# Patient Record
Sex: FEMALE | Race: WHITE | NOT HISPANIC OR LATINO | Employment: STUDENT | URBAN - METROPOLITAN AREA
[De-identification: names, ages, dates, MRNs, and addresses within clinical notes are randomized per-mention and may not be internally consistent; named-entity substitution may affect disease eponyms.]

---
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
## 2025-03-03 ENCOUNTER — TELEPHONE (OUTPATIENT)
Dept: GASTROENTEROLOGY | Facility: CLINIC | Age: 20
End: 2025-03-03
Payer: COMMERCIAL

## 2025-03-03 RX ORDER — OMEPRAZOLE 40 MG/1
40 CAPSULE, DELAYED RELEASE ORAL DAILY
Qty: 30 CAPSULE | Refills: 5 | Status: SHIPPED | OUTPATIENT
Start: 2025-03-03

## 2025-04-09 ENCOUNTER — TELEPHONE (OUTPATIENT)
Dept: GASTROENTEROLOGY | Facility: CLINIC | Age: 20
End: 2025-04-09

## 2025-04-09 NOTE — TELEPHONE ENCOUNTER
Hub staff attempted to follow warm transfer process and was unsuccessful     Caller: Varsha Lowe    Relationship to patient: Self    Best call back number: [phone]      Patient is needing: PT STATED HER SYMPTOMS ARE GETTING WORSE AND EVERY TME SHE EATS, ITS COMING BACK UP. WOULD LIKE TO BE SEEN SOONER, PLS CALL PT TO DISCUSS.

## 2025-04-23 ENCOUNTER — TELEPHONE (OUTPATIENT)
Dept: GASTROENTEROLOGY | Facility: CLINIC | Age: 20
End: 2025-04-23

## 2025-04-23 NOTE — TELEPHONE ENCOUNTER
Hub staff attempted to follow warm transfer process and was unsuccessful     Caller: Varsha Lowe    Relationship to patient: Self    Best call back number: [phone]    Patient is needing: PATIENT NEEDING TO SPEAK WITH CLINICAL.  STATING ITS URGENT.  DID NOT SPECIFY

## 2025-05-09 ENCOUNTER — APPOINTMENT (OUTPATIENT)
Dept: ULTRASOUND IMAGING | Facility: HOSPITAL | Age: 20
End: 2025-05-09
Payer: COMMERCIAL

## 2025-05-09 ENCOUNTER — TRANSCRIBE ORDERS (OUTPATIENT)
Dept: LAB | Facility: HOSPITAL | Age: 20
End: 2025-05-09
Payer: COMMERCIAL

## 2025-05-09 ENCOUNTER — LAB (OUTPATIENT)
Dept: LAB | Facility: HOSPITAL | Age: 20
End: 2025-05-09
Payer: COMMERCIAL

## 2025-05-09 ENCOUNTER — HOSPITAL ENCOUNTER (EMERGENCY)
Facility: HOSPITAL | Age: 20
Discharge: HOME | End: 2025-05-10
Attending: EMERGENCY MEDICINE
Payer: COMMERCIAL

## 2025-05-09 DIAGNOSIS — O36.80X0 PREGNANCY OF UNKNOWN ANATOMIC LOCATION: Primary | ICD-10-CM

## 2025-05-09 DIAGNOSIS — O36.80X0 PREGNANCY, LOCATION UNKNOWN: Primary | ICD-10-CM

## 2025-05-09 LAB
ABO GROUP BLD: NORMAL
ALBUMIN SERPL-MCNC: 4.2 G/DL (ref 3.5–5.2)
ALBUMIN/GLOB SERPL: 1.3 G/DL
ALP SERPL-CCNC: 96 U/L (ref 39–117)
ALT SERPL W P-5'-P-CCNC: 25 U/L (ref 1–33)
ANION GAP SERPL CALCULATED.3IONS-SCNC: 11.9 MMOL/L (ref 5–15)
AST SERPL-CCNC: 24 U/L (ref 1–32)
BASOPHILS # BLD AUTO: 0.09 10*3/MM3 (ref 0–0.2)
BASOPHILS NFR BLD AUTO: 0.8 % (ref 0–1.5)
BILIRUB SERPL-MCNC: <0.2 MG/DL (ref 0–1.2)
BLD GP AB SCN SERPL QL: NEGATIVE
BUN SERPL-MCNC: 12 MG/DL (ref 6–20)
BUN/CREAT SERPL: 14.3 (ref 7–25)
CALCIUM SPEC-SCNC: 9.5 MG/DL (ref 8.6–10.5)
CHLORIDE SERPL-SCNC: 103 MMOL/L (ref 98–107)
CO2 SERPL-SCNC: 23.1 MMOL/L (ref 22–29)
CREAT SERPL-MCNC: 0.84 MG/DL (ref 0.57–1)
DEPRECATED RDW RBC AUTO: 38.3 FL (ref 37–54)
EGFRCR SERPLBLD CKD-EPI 2021: 102.8 ML/MIN/1.73
EOSINOPHIL # BLD AUTO: 0.51 10*3/MM3 (ref 0–0.4)
EOSINOPHIL NFR BLD AUTO: 4.6 % (ref 0.3–6.2)
ERYTHROCYTE [DISTWIDTH] IN BLOOD BY AUTOMATED COUNT: 14 % (ref 12.3–15.4)
GLOBULIN UR ELPH-MCNC: 3.2 GM/DL
GLUCOSE SERPL-MCNC: 110 MG/DL (ref 65–99)
HCG INTACT+B SERPL-ACNC: 293 MIU/ML
HCG INTACT+B SERPL-ACNC: 403 MIU/ML
HCT VFR BLD AUTO: 35.4 % (ref 34–46.6)
HGB BLD-MCNC: 11.3 G/DL (ref 12–15.9)
IMM GRANULOCYTES # BLD AUTO: 0.05 10*3/MM3 (ref 0–0.05)
IMM GRANULOCYTES NFR BLD AUTO: 0.5 % (ref 0–0.5)
LYMPHOCYTES # BLD AUTO: 2.5 10*3/MM3 (ref 0.7–3.1)
LYMPHOCYTES NFR BLD AUTO: 22.7 % (ref 19.6–45.3)
MCH RBC QN AUTO: 24.6 PG (ref 26.6–33)
MCHC RBC AUTO-ENTMCNC: 31.9 G/DL (ref 31.5–35.7)
MCV RBC AUTO: 77.1 FL (ref 79–97)
MONOCYTES # BLD AUTO: 0.9 10*3/MM3 (ref 0.1–0.9)
MONOCYTES NFR BLD AUTO: 8.2 % (ref 5–12)
NEUTROPHILS NFR BLD AUTO: 6.96 10*3/MM3 (ref 1.7–7)
NEUTROPHILS NFR BLD AUTO: 63.2 % (ref 42.7–76)
NRBC BLD AUTO-RTO: 0 /100 WBC (ref 0–0.2)
PLATELET # BLD AUTO: 521 10*3/MM3 (ref 140–450)
PMV BLD AUTO: 9.1 FL (ref 6–12)
POTASSIUM SERPL-SCNC: 3.7 MMOL/L (ref 3.5–5.2)
PROT SERPL-MCNC: 7.4 G/DL (ref 6–8.5)
RBC # BLD AUTO: 4.59 10*6/MM3 (ref 3.77–5.28)
RH BLD: POSITIVE
SODIUM SERPL-SCNC: 138 MMOL/L (ref 136–145)
T&S EXPIRATION DATE: NORMAL
WBC NRBC COR # BLD AUTO: 11.01 10*3/MM3 (ref 3.4–10.8)

## 2025-05-09 PROCEDURE — 76815 OB US LIMITED FETUS(S): CPT

## 2025-05-09 PROCEDURE — 84702 CHORIONIC GONADOTROPIN TEST: CPT | Performed by: PHYSICIAN ASSISTANT

## 2025-05-09 PROCEDURE — 36415 COLL VENOUS BLD VENIPUNCTURE: CPT

## 2025-05-09 PROCEDURE — 86901 BLOOD TYPING SEROLOGIC RH(D): CPT | Performed by: PHYSICIAN ASSISTANT

## 2025-05-09 PROCEDURE — 99284 EMERGENCY DEPT VISIT MOD MDM: CPT

## 2025-05-09 PROCEDURE — 93976 VASCULAR STUDY: CPT

## 2025-05-09 PROCEDURE — 80053 COMPREHEN METABOLIC PANEL: CPT | Performed by: PHYSICIAN ASSISTANT

## 2025-05-09 PROCEDURE — 84702 CHORIONIC GONADOTROPIN TEST: CPT

## 2025-05-09 PROCEDURE — 76817 TRANSVAGINAL US OBSTETRIC: CPT

## 2025-05-09 PROCEDURE — 86850 RBC ANTIBODY SCREEN: CPT | Performed by: PHYSICIAN ASSISTANT

## 2025-05-09 PROCEDURE — 86900 BLOOD TYPING SEROLOGIC ABO: CPT | Performed by: PHYSICIAN ASSISTANT

## 2025-05-09 PROCEDURE — 85025 COMPLETE CBC W/AUTO DIFF WBC: CPT | Performed by: PHYSICIAN ASSISTANT

## 2025-05-09 RX ORDER — SODIUM CHLORIDE 0.9 % (FLUSH) 0.9 %
10 SYRINGE (ML) INJECTION AS NEEDED
Status: DISCONTINUED | OUTPATIENT
Start: 2025-05-09 | End: 2025-05-10 | Stop reason: HOSPADM

## 2025-05-10 VITALS
HEART RATE: 95 BPM | OXYGEN SATURATION: 98 % | RESPIRATION RATE: 16 BRPM | DIASTOLIC BLOOD PRESSURE: 85 MMHG | SYSTOLIC BLOOD PRESSURE: 143 MMHG | HEIGHT: 68 IN | BODY MASS INDEX: 37.89 KG/M2 | TEMPERATURE: 97.1 F | WEIGHT: 250 LBS

## 2025-05-10 NOTE — DISCHARGE INSTRUCTIONS
Follow-up with OB/GYN on Monday for repeat hCG.  Proceed immediately to emergency department if you have any vaginal bleeding.

## 2025-05-10 NOTE — ED PROVIDER NOTES
MD ATTESTATION NOTE    The ANJELICA and I have discussed this patient's history, physical exam, and treatment plan.  I have reviewed the documentation and personally had a face to face interaction with the patient. I affirm the documentation and agree with the treatment and plan.  The attached note describes my personal findings.      I provided a substantive portion of the care of the patient.  I personally performed the physical exam in its entirety, and below are my findings.        Brief HPI: This patient is a 19-year-old female presenting to the emergency room today with left sided pelvic pain.  She states that she was at her gynecologist office and had a positive pregnancy test however nothing obviously seen on transvaginal ultrasound.  She presents today with worsening discomfort.  She however denies dysuria/hematuria, back pain, or vaginal bleeding.      PHYSICAL EXAM  ED Triage Vitals   Temp Heart Rate Resp BP SpO2   05/09/25 2023 05/09/25 2023 05/09/25 2023 05/09/25 2026 05/09/25 2023   99.2 °F (37.3 °C) 119 20 142/84 100 %      Temp src Heart Rate Source Patient Position BP Location FiO2 (%)   -- -- 05/09/25 2026 05/09/25 2026 --     Sitting Right arm          GENERAL: Resting comfortably and in no acute distress, nontoxic in appearance  HENT: nares patent  EYES: no scleral icterus  CV: regular rhythm, normal rate, no M/R/G  RESPIRATORY: normal effort, lungs clear bilaterally  ABDOMEN: soft, nontender, no rebound or guarding  MUSCULOSKELETAL: no deformity, no edema  NEURO: alert, moves all extremities, follows commands  PSYCH:  calm, cooperative  SKIN: warm, dry    Vital signs and nursing notes reviewed.      Differential diagnosis includes but is not limited to ectopic pregnancy, early first trimester pregnancy, or ovarian cyst.      Plan: We will obtain labs as well as a pelvic/transvaginal ultrasound.  We will discuss the findings with her OB/GYN following.      Lab values independently interpreted by  myself with my interpretation showing a slightly elevating hCG with a level of 403, up when compared to an office hCG earlier of 293.       Lonnie Acuña MD  05/10/25 0052

## 2025-05-10 NOTE — ED PROVIDER NOTES
EMERGENCY DEPARTMENT ENCOUNTER  Room Number:  01/01  PCP: Daniel Garrison MD  Independent Historians: Patient      HPI:  Chief Complaint: had concerns including Abdominal Pain and Pregnancy Problem.     A complete HPI/ROS/PMH/PSH/SH/FH are unobtainable due to: None    Chronic or social conditions impacting patient care (Social Determinants of Health): None      Context: Varsha Lowe is a 19 y.o. female with a medical history of asthma who presents to the ED c/o acute left-sided pelvic pain.  Patient reports for the last several days she has had pain on the left side of her pelvis.  She thought this was gas.  She had an appointment with gynecology today after having 19 positive home pregnancy test.  Her hCG was 293 in office today.  She had transvaginal ultrasound performed that showed left tubal mass versus ectopic pregnancy.  Denies vaginal bleeding or spotting.  Reports her pain has persisted throughout the day, prompting visit to emergency department.  Established with [facility].  Has had some nausea but denies vomiting or fever.  No other systemic complaints at this time.      Review of prior external notes (non-ED) -and- Review of prior external test results outside of this encounter:  Patient seen in office by PCP on 11/6/2024 for obesity.  Unable to load note to review assessment and plan.  Reviewed labs collected on 10/29/2024.  CBC with hemoglobin 12.2, CMP with creatinine 0.88.    Prescription drug monitoring program review:     N/A    PAST MEDICAL HISTORY  Active Ambulatory Problems     Diagnosis Date Noted    Upper abdominal pain 07/29/2024    Abdominal bloating with cramps 07/29/2024    Irregular bowel habits 07/29/2024    Abdominal pain 10/30/2024     Resolved Ambulatory Problems     Diagnosis Date Noted    No Resolved Ambulatory Problems     Past Medical History:   Diagnosis Date    Asthma     No pertinent past medical history          PAST SURGICAL HISTORY  Past Surgical History:    Procedure Laterality Date    COLONOSCOPY N/A 9/16/2024    Procedure: COLONOSCOPY FOR SCREENING;  Surgeon: Fred Henderson MD;  Location: [facility] MAIN OR;  Service: Gastroenterology;  Laterality: N/A;  normal    ENDOSCOPY N/A 9/16/2024    Procedure: ESOPHAGOGASTRODUODENOSCOPY;  Surgeon: Fred Henderson MD;  Location: [facility] [place] [facility] MAIN OR;  Service: Gastroenterology;  Laterality: N/A;  normal    FRENULECTOMY      as an infant and at 18 years old    NO PAST SURGERIES           FAMILY HISTORY  Family History   Problem Relation Age of Onset    Irritable bowel syndrome Mother     Thyroid disease Mother     Colon polyps Father     Colon cancer Paternal Uncle     Breast cancer Neg Hx     Ovarian cancer Neg Hx     Uterine cancer Neg Hx     Crohn's disease Neg Hx     Ulcerative colitis Neg Hx          SOCIAL HISTORY  Social History     Socioeconomic History    Marital status: Significant Other   Tobacco Use    Smoking status: Never    Smokeless tobacco: Never   Vaping Use    Vaping status: Never Used   Substance and Sexual Activity    Alcohol use: Never    Drug use: Never    Sexual activity: Yes     Partners: Male     Birth control/protection: I.U.D., Condom         ALLERGIES  Amoxicillin, Diphenhydramine, and Penicillins      REVIEW OF SYSTEMS  Included in HPI  All systems reviewed and negative except for those discussed in HPI.      PHYSICAL EXAM    I have reviewed the triage vital signs and nursing notes.    ED Triage Vitals   Temp Heart Rate Resp BP SpO2   05/09/25 2023 05/09/25 2023 05/09/25 2023 05/09/25 2026 05/09/25 2023   99.2 °F (37.3 °C) 119 20 142/84 100 %      Temp src Heart Rate Source Patient Position BP Location FiO2 (%)   -- -- 05/09/25 2026 05/09/25 2026 --     Sitting Right arm        Physical Exam  Constitutional:       General: She is not in acute distress.     Appearance: She is well-developed.   HENT:      Head: Normocephalic and atraumatic.   Eyes:      Extraocular Movements: Extraocular movements  intact.   Cardiovascular:      Rate and Rhythm: Normal rate and regular rhythm.      Heart sounds: Normal heart sounds.   Pulmonary:      Effort: Pulmonary effort is normal.      Breath sounds: Normal breath sounds.   Abdominal:      General: There is no distension.      Tenderness:  in the left lower quadrant   Skin:     General: Skin is warm.   Neurological:      General: No focal deficit present.      Mental Status: She is alert and oriented to person, place, and time.   Psychiatric:         Mood and Affect: Mood normal.             LAB RESULTS  Recent Results (from the past 24 hours)   hCG, Quantitative, Pregnancy    Collection Time: 05/09/25  1:00 PM    Specimen: Blood   Result Value Ref Range    HCG Quantitative 293.00 mIU/mL   Comprehensive Metabolic Panel    Collection Time: 05/09/25 10:22 PM    Specimen: Blood   Result Value Ref Range    Glucose 110 (H) 65 - 99 mg/dL    BUN 12 6 - 20 mg/dL    Creatinine 0.84 0.57 - 1.00 mg/dL    Sodium 138 136 - 145 mmol/L    Potassium 3.7 3.5 - 5.2 mmol/L    Chloride 103 98 - 107 mmol/L    CO2 23.1 22.0 - 29.0 mmol/L    Calcium 9.5 8.6 - 10.5 mg/dL    Total Protein 7.4 6.0 - 8.5 g/dL    Albumin 4.2 3.5 - 5.2 g/dL    ALT (SGPT) 25 1 - 33 U/L    AST (SGOT) 24 1 - 32 U/L    Alkaline Phosphatase 96 39 - 117 U/L    Total Bilirubin <0.2 0.0 - 1.2 mg/dL    Globulin 3.2 gm/dL    A/G Ratio 1.3 g/dL    BUN/Creatinine Ratio 14.3 7.0 - 25.0    Anion Gap 11.9 5.0 - 15.0 mmol/L    eGFR 102.8 >60.0 mL/min/1.73   hCG, Quantitative, Pregnancy    Collection Time: 05/09/25 10:22 PM    Specimen: Blood   Result Value Ref Range    HCG Quantitative 403.00 mIU/mL   Type & Screen    Collection Time: 05/09/25 10:22 PM    Specimen: Blood   Result Value Ref Range    ABO Type O     RH type Positive     Antibody Screen Negative     T&S Expiration Date 5/12/2025 11:59:59 PM    CBC Auto Differential    Collection Time: 05/09/25 10:22 PM    Specimen: Blood   Result Value Ref Range    WBC 11.01 (H) 3.40 -  10.80 10*3/mm3    RBC 4.59 3.77 - 5.28 10*6/mm3    Hemoglobin 11.3 (L) 12.0 - 15.9 g/dL    Hematocrit 35.4 34.0 - 46.6 %    MCV 77.1 (L) 79.0 - 97.0 fL    MCH 24.6 (L) 26.6 - 33.0 pg    MCHC 31.9 31.5 - 35.7 g/dL    RDW 14.0 12.3 - 15.4 %    RDW-SD 38.3 37.0 - 54.0 fl    MPV 9.1 6.0 - 12.0 fL    Platelets 521 (H) 140 - 450 10*3/mm3    Neutrophil % 63.2 42.7 - 76.0 %    Lymphocyte % 22.7 19.6 - 45.3 %    Monocyte % 8.2 5.0 - 12.0 %    Eosinophil % 4.6 0.3 - 6.2 %    Basophil % 0.8 0.0 - 1.5 %    Immature Grans % 0.5 0.0 - 0.5 %    Neutrophils, Absolute 6.96 1.70 - 7.00 10*3/mm3    Lymphocytes, Absolute 2.50 0.70 - 3.10 10*3/mm3    Monocytes, Absolute 0.90 0.10 - 0.90 10*3/mm3    Eosinophils, Absolute 0.51 (H) 0.00 - 0.40 10*3/mm3    Basophils, Absolute 0.09 0.00 - 0.20 10*3/mm3    Immature Grans, Absolute 0.05 0.00 - 0.05 10*3/mm3    nRBC 0.0 0.0 - 0.2 /100 WBC         RADIOLOGY  US Ob Limited 1 + Fetuses  US Ob Limited 1 + Fetuses, US Testicular or Ovarian Vascular Limited  Result Date: 5/9/2025  PELVIC ULTRASOUND  HISTORY: Left pelvic pain  COMPARISON: None available.  TECHNIQUE: Grayscale, color Doppler, and spectral Doppler waveform analysis was performed through the pelvis transabdominally and transvaginally.  FINDINGS: Uterus measures 6.9 x 3.9 x 5.2 cm. It is retroverted. The endometrium measures up to 1.7 cm. No intrauterine pregnancy is seen. The right ovary measures 2.7 x 3.0 x 1.6 cm. The left ovary measures 4.1 x 2.7 x 4.5 cm. Normal color Doppler flow is noted within the ovaries. The patient does have a cyst arising from the left ovary, measuring 2.5 x 2.6 x 2.5 cm. No adnexal masses are seen. There is some free fluid within the pelvis.      No intrauterine pregnancy is identified. Estimated gestational age by dates is 13 weeks and 5 days. The patient's quantitative beta hCG is 403, which has increased when compared to earlier today, when it measured 293. While no adnexal masses are seen on the current  study, an earlier exam evidently demonstrated a possible ectopic pregnancy within the left adnexa. Given the lack of an intrauterine pregnancy, as well as the rising beta hCG, ectopic pregnancy remains in the differential, and close obstetric and sonographic follow-up is recommended, as is attention to serial beta-hCGs.  This report was finalized on 5/9/2025 11:26 PM by Dr. Delphine Vela M.D on Workstation: [id number]          MEDICATIONS GIVEN IN ER  Medications   sodium chloride 0.9 % flush 10 mL (has no administration in time range)         ORDERS PLACED DURING THIS VISIT:  Orders Placed This Encounter   Procedures    US Ob Limited 1 + Fetuses    US Ob Transvaginal    US Testicular or Ovarian Vascular Limited    Comprehensive Metabolic Panel    hCG, Quantitative, Pregnancy    CBC Auto Differential    OB/GYN (on-call MD unless specified)    Type & Screen    Insert Peripheral IV    CBC & Differential         OUTPATIENT MEDICATION MANAGEMENT:  Current Facility-Administered Medications Ordered in Epic   Medication Dose Route Frequency Provider Last Rate Last Admin    sodium chloride 0.9 % flush 10 mL  10 mL Intravenous PRN Maria Teresa Read PA-C         Current Outpatient Medications Ordered in Epic   Medication Sig Dispense Refill    clobetasol propionate (TEMOVATE) 0.05 % cream Apply 1 Application topically to the appropriate area as directed 2 (Two) Times a Day. As needed      lamoTRIgine (LaMICtal) 150 MG tablet Take 1 tablet by mouth Every Night.      NON FORMULARY MigRelief      omeprazole (priLOSEC) 40 MG capsule TAKE ONE CAPSULE BY MOUTH DAILY 30 capsule 5    ondansetron ODT (ZOFRAN-ODT) 4 MG disintegrating tablet Take 1 tablet by mouth Every 6 (Six) Hours As Needed for Nausea or Vomiting. 20 tablet 0    sertraline (ZOLOFT) 50 MG tablet Take 1 tablet by mouth Daily.      spironolactone (ALDACTONE) 100 MG tablet Take 1 tablet by mouth Daily.               PROGRESS, DATA ANALYSIS, CONSULTS, AND MEDICAL  DECISION MAKING  All labs have been independently interpreted by me.  All radiology studies have been reviewed by me. All EKG's have been independently viewed and interpreted by me.  Discussion below represents my analysis of pertinent findings related to patient's condition, differential diagnosis, treatment plan and final disposition.    Differential diagnosis includes but is not limited to ectopic pregnancy, TOA, ovarian cyst.        ED Course as of 05/10/25 0016   Fri May 09, 2025   2246 WBC(!): 11.01 [MP]   2246 Hemoglobin(!): 11.3 [MP]   2339 Will place consult to OB/GYN to discuss [MP]   2347 I spoke with Kitty with [facility].  Discussed patient presentation and ED findings.  Discussed patient presentation and ED findings.  She recommends having patient follow-up closely in office next week.  Patient should go to emergency department for any bleeding. [MP]   Sat May 10, 2025   0015 Patient presents to ED with possible ectopic pregnancy.  Worked up earlier today in office with hCG which was 293.  It is up to 403 at this time.  Noted to have left ovarian cyst but no definitive IUP or ectopic pregnancy.  Given pelvic pain I would still consider ectopic in the differential.  She states she is scheduled to have repeat lab draw on Sunday.  Encouraged her to follow-up closely with OB.  Discussed ED return precautions.  She is otherwise well-appearing, hemodynamically stable, and therefore appropriate for discharge. [MP]      ED Course User Index  [MP] Maria Teresa Read PA-C             AS OF 00:16 EDT VITALS:    BP - 131/82  HR - 100  TEMP - 99.2 °F (37.3 °C)  O2 SATS - 100%    COMPLEXITY OF CARE  Admission was considered but after careful review of the patient's presentation, physical examination, diagnostic results, and response to treatment the patient may be safely discharged with outpatient follow-up.      DIAGNOSIS  Final diagnoses:   Pregnancy of unknown anatomic location         DISPOSITION  ED  Disposition       ED Disposition   Discharge    Condition   Stable    Comment   --                Please note that portions of this document were completed with a voice recognition program.    Note Disclaimer: At [facility], we believe that sharing information builds trust and better relationships. You are receiving this note because you recently visited [facility]. It is possible you will see health information before a provider has talked with you about it. This kind of information can be easy to misunderstand. To help you fully understand what it means for your health, we urge you to discuss this note with your provider.     Maria Teresa Read PA-C  05/10/25 0016

## 2025-05-11 ENCOUNTER — LAB REQUISITION (OUTPATIENT)
Dept: LAB | Facility: HOSPITAL | Age: 20
End: 2025-05-11
Payer: COMMERCIAL

## 2025-05-11 DIAGNOSIS — O36.80X0 PREGNANCY WITH INCONCLUSIVE FETAL VIABILITY, NOT APPLICABLE OR UNSPECIFIED: ICD-10-CM

## 2025-05-11 LAB — HCG INTACT+B SERPL-ACNC: 777 MIU/ML

## 2025-05-11 PROCEDURE — 84702 CHORIONIC GONADOTROPIN TEST: CPT

## 2025-05-14 ENCOUNTER — TELEPHONE (OUTPATIENT)
Dept: OBSTETRICS AND GYNECOLOGY | Facility: CLINIC | Age: 20
End: 2025-05-14
Payer: COMMERCIAL

## 2025-05-14 ENCOUNTER — TELEPHONE (OUTPATIENT)
Dept: OBSTETRICS AND GYNECOLOGY | Age: 20
End: 2025-05-14
Payer: COMMERCIAL

## 2025-05-14 NOTE — TELEPHONE ENCOUNTER
Former pt last seen in  calling stating she has been seen with [facility] for (+) pregnancy but was advised they believe she is having an ectopic pregnancy & prescribed her an  pill to take. Pt stating she did not feel comfortable with this & proceeded to go to [facility] ER on 25 (see US & HCG labs in chart). No intrauterine pregnancy was seen during US but pt stating HCG levels are rising. Pt denies any vaginal bleeding or pain. Pt calling today to see what Dr. Núñez recommends for her to do. Pt does have a new OB appt scheduled with Dr. Dixon on 25 but stated she prefers to be seen here if possible since she is already an established pt. I attempted to schedule pt in office with US but cannot find any open appointments within appropriate time frame. Please advise on recommendations for pt.

## 2025-05-15 NOTE — TELEPHONE ENCOUNTER
The reported findings are concerning for ectopic pregnancy.  I think she should receive prompt care so should go to appointment at [facility] today.  Alternatively, I am happy to see her in [place] tomorrow or we could see if Dr. Sanches would see her in [place].  Regardless, I think prompt care should be her priority.  Please let me know how she would like to proceed.

## 2025-05-15 NOTE — TELEPHONE ENCOUNTER
Problem: NEUROSENSORY - ADULT  Goal: Achieves stable or improved neurological status  Description: INTERVENTIONS  - Monitor and report changes in neurological status  - Monitor vital signs such as temperature, blood pressure, glucose, and any other labs ordered   - Initiate measures to prevent increased intracranial pressure  - Monitor for seizure activity and implement precautions if appropriate      Outcome: Progressing  Goal: Remains free of injury related to seizures activity  Description: INTERVENTIONS  - Maintain airway, patient safety  and administer oxygen as ordered  - Monitor patient for seizure activity, document and report duration and description of seizure to physician/advanced practitioner  - If seizure occurs,  ensure patient safety during seizure  - Reorient patient post seizure  - Seizure pads on all 4 side rails  - Instruct patient/family to notify RN of any seizure activity including if an aura is experienced  - Instruct patient/family to call for assistance with activity based on nursing assessment  - Administer anti-seizure medications if ordered    Outcome: Progressing  Goal: Achieves maximal functionality and self care  Description: INTERVENTIONS  - Monitor swallowing and airway patency with patient fatigue and changes in neurological status  - Encourage and assist patient to increase activity and self care.   - Encourage visually impaired, hearing impaired and aphasic patients to use assistive/communication devices  Outcome: Progressing     Problem: CARDIOVASCULAR - ADULT  Goal: Maintains optimal cardiac output and hemodynamic stability  Description: INTERVENTIONS:  - Monitor I/O, vital signs and rhythm  - Monitor for S/S and trends of decreased cardiac output  - Administer and titrate ordered vasoactive medications to optimize hemodynamic stability  - Assess quality of pulses, skin color and temperature  - Assess for signs of decreased coronary artery perfusion  - Instruct patient to  Spoke w/pt's mother Carolina Lowe.  She states they are currently at the appt at [facility] and an IUP was noted on the [place].  Pt is requesting to see Dr. Sanches for follow up next week.   report change in severity of symptoms  Outcome: Progressing  Goal: Absence of cardiac dysrhythmias or at baseline rhythm  Description: INTERVENTIONS:  - Continuous cardiac monitoring, vital signs, obtain 12 lead EKG if ordered  - Administer antiarrhythmic and heart rate control medications as ordered  - Monitor electrolytes and administer replacement therapy as ordered  Outcome: Progressing     Problem: RESPIRATORY - ADULT  Goal: Achieves optimal ventilation and oxygenation  Description: INTERVENTIONS:  - Assess for changes in respiratory status  - Assess for changes in mentation and behavior  - Position to facilitate oxygenation and minimize respiratory effort  - Oxygen administered by appropriate delivery if ordered  - Initiate smoking cessation education as indicated  - Encourage broncho-pulmonary hygiene including cough, deep breathe, Incentive Spirometry  - Assess the need for suctioning and aspirate as needed  - Assess and instruct to report SOB or any respiratory difficulty  - Respiratory Therapy support as indicated  Outcome: Progressing     Problem: GASTROINTESTINAL - ADULT  Goal: Minimal or absence of nausea and/or vomiting  Description: INTERVENTIONS:  - Administer IV fluids if ordered to ensure adequate hydration  - Maintain NPO status until nausea and vomiting are resolved  - Nasogastric tube if ordered  - Administer ordered antiemetic medications as needed  - Provide nonpharmacologic comfort measures as appropriate  - Advance diet as tolerated, if ordered  - Consider nutrition services referral to assist patient with adequate nutrition and appropriate food choices  Outcome: Progressing  Goal: Maintains or returns to baseline bowel function  Description: INTERVENTIONS:  - Assess bowel function  - Encourage oral fluids to ensure adequate hydration  - Administer IV fluids if ordered to ensure adequate hydration  - Administer ordered medications as needed  - Encourage mobilization and activity  - Consider  nutritional services referral to assist patient with adequate nutrition and appropriate food choices  Outcome: Progressing  Goal: Maintains adequate nutritional intake  Description: INTERVENTIONS:  - Monitor percentage of each meal consumed  - Identify factors contributing to decreased intake, treat as appropriate  - Assist with meals as needed  - Monitor I&O, weight, and lab values if indicated  - Obtain nutrition services referral as needed  Outcome: Progressing  Goal: Establish and maintain optimal ostomy function  Description: INTERVENTIONS:  - Assess bowel function  - Encourage oral fluids to ensure adequate hydration  - Administer IV fluids if ordered to ensure adequate hydration   - Administer ordered medications as needed  - Encourage mobilization and activity  - Nutrition services referral to assist patient with appropriate food choices  - Assess stoma site  - Consider wound care consult   Outcome: Progressing  Goal: Oral mucous membranes remain intact  Description: INTERVENTIONS  - Assess oral mucosa and hygiene practices  - Implement preventative oral hygiene regimen  - Implement oral medicated treatments as ordered  - Initiate Nutrition services referral as needed  Outcome: Progressing     Problem: GENITOURINARY - ADULT  Goal: Maintains or returns to baseline urinary function  Description: INTERVENTIONS:  - Assess urinary function  - Encourage oral fluids to ensure adequate hydration if ordered  - Administer IV fluids as ordered to ensure adequate hydration  - Administer ordered medications as needed  - Offer frequent toileting  - Follow urinary retention protocol if ordered  Outcome: Progressing  Goal: Absence of urinary retention  Description: INTERVENTIONS:  - Assess patient’s ability to void and empty bladder  - Monitor I/O  - Bladder scan as needed  - Discuss with physician/AP medications to alleviate retention as needed  - Discuss catheterization for long term situations as appropriate  Outcome:  Progressing  Goal: Urinary catheter remains patent  Description: INTERVENTIONS:  - Assess patency of urinary catheter  - If patient has a chronic benson, consider changing catheter if non-functioning  - Follow guidelines for intermittent irrigation of non-functioning urinary catheter  Outcome: Progressing     Problem: METABOLIC, FLUID AND ELECTROLYTES - ADULT  Goal: Electrolytes maintained within normal limits  Description: INTERVENTIONS:  - Monitor labs and assess patient for signs and symptoms of electrolyte imbalances  - Administer electrolyte replacement as ordered  - Monitor response to electrolyte replacements, including repeat lab results as appropriate  - Instruct patient on fluid and nutrition as appropriate  Outcome: Progressing  Goal: Fluid balance maintained  Description: INTERVENTIONS:  - Monitor labs   - Monitor I/O and WT  - Instruct patient on fluid and nutrition as appropriate  - Assess for signs & symptoms of volume excess or deficit  Outcome: Progressing  Goal: Glucose maintained within target range  Description: INTERVENTIONS:  - Monitor Blood Glucose as ordered  - Assess for signs and symptoms of hyperglycemia and hypoglycemia  - Administer ordered medications to maintain glucose within target range  - Assess nutritional intake and initiate nutrition service referral as needed  Outcome: Progressing        Problem: HEMATOLOGIC - ADULT  Goal: Maintains hematologic stability  Description: INTERVENTIONS  - Assess for signs and symptoms of bleeding or hemorrhage  - Monitor labs  - Administer supportive blood products/factors as ordered and appropriate  Outcome: Progressing     Problem: MUSCULOSKELETAL - ADULT  Goal: Maintain or return mobility to safest level of function  Description: INTERVENTIONS:  - Assess patient's ability to carry out ADLs; assess patient's baseline for ADL function and identify physical deficits which impact ability to perform ADLs (bathing, care of mouth/teeth, toileting,  grooming, dressing, etc.)  - Assess/evaluate cause of self-care deficits   - Assess range of motion  - Assess patient's mobility  - Assess patient's need for assistive devices and provide as appropriate  - Encourage maximum independence but intervene and supervise when necessary  - Involve family in performance of ADLs  - Assess for home care needs following discharge   - Consider OT consult to assist with ADL evaluation and planning for discharge  - Provide patient education as appropriate  Outcome: Progressing  Goal: Maintain proper alignment of affected body part  Description: INTERVENTIONS:  - Support, maintain and protect limb and body alignment  - Provide patient/ family with appropriate education  Outcome: Progressing     Problem: PAIN - ADULT  Goal: Verbalizes/displays adequate comfort level or baseline comfort level  Description: Interventions:  - Encourage patient to monitor pain and request assistance  - Assess pain using appropriate pain scale  - Administer analgesics based on type and severity of pain and evaluate response  - Implement non-pharmacological measures as appropriate and evaluate response  - Consider cultural and social influences on pain and pain management  - Notify physician/advanced practitioner if interventions unsuccessful or patient reports new pain  Outcome: Progressing     Problem: DISCHARGE PLANNING  Goal: Discharge to home or other facility with appropriate resources  Description: INTERVENTIONS:  - Identify barriers to discharge w/patient and caregiver  - Arrange for needed discharge resources and transportation as appropriate  - Identify discharge learning needs (meds, wound care, etc.)  - Arrange for interpretive services to assist at discharge as needed  - Refer to Case Management Department for coordinating discharge planning if the patient needs post-hospital services based on physician/advanced practitioner order or complex needs related to functional status, cognitive  ability, or social support system  Outcome: Progressing     Problem: Knowledge Deficit  Goal: Patient/family/caregiver demonstrates understanding of disease process, treatment plan, medications, and discharge instructions  Description: Complete learning assessment and assess knowledge base.  Interventions:  - Provide teaching at level of understanding  - Provide teaching via preferred learning methods  Outcome: Progressing     Problem: Prexisting or High Potential for Compromised Skin Integrity  Goal: Skin integrity is maintained or improved  Description: INTERVENTIONS:  - Identify patients at risk for skin breakdown  - Assess and monitor skin integrity  - Assess and monitor nutrition and hydration status  - Monitor labs   - Assess for incontinence   - Turn and reposition patient  - Assist with mobility/ambulation  - Relieve pressure over bony prominences  - Avoid friction and shearing  - Provide appropriate hygiene as needed including keeping skin clean and dry  - Evaluate need for skin moisturizer/barrier cream  - Collaborate with interdisciplinary team   - Patient/family teaching  - Consider wound care consult   Outcome: Progressing

## 2025-06-02 ENCOUNTER — OFFICE VISIT (OUTPATIENT)
Dept: OBSTETRICS AND GYNECOLOGY | Age: 20
End: 2025-06-02
Payer: COMMERCIAL

## 2025-06-02 VITALS
SYSTOLIC BLOOD PRESSURE: 108 MMHG | WEIGHT: 256 LBS | HEIGHT: 68 IN | DIASTOLIC BLOOD PRESSURE: 60 MMHG | BODY MASS INDEX: 38.8 KG/M2

## 2025-06-02 DIAGNOSIS — R63.8 INCREASED BMI: ICD-10-CM

## 2025-06-02 DIAGNOSIS — N92.6 IRREGULAR MENSES: Primary | ICD-10-CM

## 2025-06-02 DIAGNOSIS — F31.70 BIPOLAR DISORDER IN FULL REMISSION, MOST RECENT EPISODE UNSPECIFIED TYPE: ICD-10-CM

## 2025-06-02 PROBLEM — R14.0 ABDOMINAL BLOATING WITH CRAMPS: Status: RESOLVED | Noted: 2024-07-29 | Resolved: 2025-06-02

## 2025-06-02 PROBLEM — R10.10 UPPER ABDOMINAL PAIN: Status: RESOLVED | Noted: 2024-07-29 | Resolved: 2025-06-02

## 2025-06-02 PROBLEM — R10.9 ABDOMINAL PAIN: Status: RESOLVED | Noted: 2024-10-30 | Resolved: 2025-06-02

## 2025-06-02 PROBLEM — R10.9 ABDOMINAL BLOATING WITH CRAMPS: Status: RESOLVED | Noted: 2024-07-29 | Resolved: 2025-06-02

## 2025-06-02 RX ORDER — ALBUTEROL SULFATE 90 UG/1
INHALANT RESPIRATORY (INHALATION)
COMMUNITY

## 2025-06-02 RX ORDER — PRENATAL VIT/IRON FUM/FOLIC AC 27MG-0.8MG
TABLET ORAL DAILY
COMMUNITY

## 2025-06-02 NOTE — PROGRESS NOTES
Subjective       History of Present Illness  Varsha Lowe is a 19 y.o. female is being seen today for irregular disease, history of possible ectopic pregnancy, and confirmation of possible intrauterine pregnancy    Patient was seen in the emergency room and at another OBs office.  They were uncertain whether it was an ectopic pregnancy or not.  Today's ultrasound fortunately confirmed an IUP at 7 weeks 1 day with an DEO of   Was no subchorionic hemorrhage.  She has what appears to be a corpus luteal cyst.    Past medical past surgical history reviewed  This is her first pregnancy,  Her fiancé is overall healthy.  Neither of them have any congenital or family history of birth defects  Chief Complaint   Patient presents with    Possible Pregnancy     Pregnancy confirmation, LMP unknown, no complaints today    .        The following portions of the patient's history were reviewed and updated as appropriate: allergies, current medications, past family history, past medical history, past social history, past surgical history and problem list.    PAST MEDICAL HISTORY  Past Medical History:   Diagnosis Date    Asthma     No pertinent past medical history      OB History    Para Term  AB Living   1 0 0 0 0 0   SAB IAB Ectopic Molar Multiple Live Births   0 0 0 0 0 0      # Outcome Date GA Lbr Elian/2nd Weight Sex Type Anes PTL Lv   1 Current              Past Surgical History:   Procedure Laterality Date    COLONOSCOPY N/A 2024    Procedure: COLONOSCOPY FOR SCREENING;  Surgeon: Fred Henderson MD;  [place] [facility] [place]  Service: Gastroenterology;  Laterality: N/A;  normal    ENDOSCOPY N/A 2024    Procedure: ESOPHAGOGASTRODUODENOSCOPY;  Surgeon: Fred Henderson MD;  [place] [facility] [place];  Service: Gastroenterology;  Laterality: N/A;  normal    FRENULECTOMY      as an infant and at 18 years old    NO PAST SURGERIES       Family History   Problem Relation Age of Onset     Irritable bowel syndrome Mother     Thyroid disease Mother     Colon polyps Father     Colon cancer Paternal Uncle     Breast cancer Neg Hx     Ovarian cancer Neg Hx     Uterine cancer Neg Hx     Crohn's disease Neg Hx     Ulcerative colitis Neg Hx      Social History     Tobacco Use   Smoking Status Never   Smokeless Tobacco Never       Current Outpatient Medications:     albuterol sulfate  (90 Base) MCG/ACT inhaler, Every 4 (Four) Hours., Disp: , Rfl:     clobetasol propionate (TEMOVATE) 0.05 % cream, Apply 1 Application topically to the appropriate area as directed 2 (Two) Times a Day. As needed, Disp: , Rfl:     lamoTRIgine (LaMICtal) 150 MG tablet, Take 1 tablet by mouth Every Night., Disp: , Rfl:     Prenatal Vit-Fe Fumarate-FA (prenatal vitamin 27-0.8) 27-0.8 MG tablet tablet, Take  by mouth Daily., Disp: , Rfl:     There is no immunization history on file for this patient.    Review of Systems       Except as outlined in history of physical illness, patient denies any changes in her GYN, , GI systems. All other systems reviewed are negative.    Objective   Physical Exam   Alert and oriented, respirations unlabored, heart regular rate and rhythm   Pelvic see ultrasound report      Assessment & Plan   Diagnoses and all orders for this visit:    1. Irregular menses (Primary)  -     OB Panel With HIV and RPR  -     Progesterone  -     HCG, B-subunit, Quantitative    2. Increased BMI    3. Bipolar disorder in full remission, most recent episode unspecified type  -     OB Panel With HIV and RPR  -     Progesterone  -     HCG, B-subunit, Quantitative      As outlined above  Ultrasound consistent with 7-week 1 day single viable intrauterine pregnancy, DEO of January 18, 2025  No subchorionic hemorrhage, apparent corpus luteal cyst.  Will continue prenatal vitamins with iron and folic acid  Likely would like to proceed with nips testing at next visit  Check OB labs this morning.  Advised patient to call me  with any problems questions concerns or changes in condition           Orders Placed This Encounter   Procedures    OB Panel With HIV and RPR     Release to patient:   Routine Release [[id number]]    Progesterone     Release to patient:   Routine Release [[id number]]    HCG, B-subunit, Quantitative     Release to patient:   Routine Release [[id number]]           EMR Dragon/ Transcription disclaimer:  Much of the encounter note is an electronic transcription/translation of spoken language to printed text. The electronic translation of spoken language may permit erroneous, or at times, nonessential words or phrases to be inadvertently transcribes; Although i have reviewed the note for such errors, some may still exist.

## 2025-06-03 LAB
ABO GROUP BLD: ABNORMAL
BASOPHILS # BLD AUTO: 0.1 X10E3/UL (ref 0–0.2)
BASOPHILS NFR BLD AUTO: 1 %
BLD GP AB SCN SERPL QL: NEGATIVE
EOSINOPHIL # BLD AUTO: 0.4 X10E3/UL (ref 0–0.4)
EOSINOPHIL NFR BLD AUTO: 3 %
ERYTHROCYTE [DISTWIDTH] IN BLOOD BY AUTOMATED COUNT: 15.5 % (ref 11.7–15.4)
HBV SURFACE AG SERPL QL IA: NEGATIVE
HCG INTACT+B SERPL-ACNC: NORMAL MIU/ML
HCT VFR BLD AUTO: 37.3 % (ref 34–46.6)
HCV AB SERPL QL IA: NON REACTIVE
HCV AB SERPL QL IA: NORMAL
HGB BLD-MCNC: 11.7 G/DL (ref 11.1–15.9)
HIV 1+2 AB+HIV1 P24 AG SERPL QL IA: NON REACTIVE
IMM GRANULOCYTES # BLD AUTO: 0.1 X10E3/UL (ref 0–0.1)
IMM GRANULOCYTES NFR BLD AUTO: 1 %
LYMPHOCYTES # BLD AUTO: 2.1 X10E3/UL (ref 0.7–3.1)
LYMPHOCYTES NFR BLD AUTO: 17 %
MCH RBC QN AUTO: 25.2 PG (ref 26.6–33)
MCHC RBC AUTO-ENTMCNC: 31.4 G/DL (ref 31.5–35.7)
MCV RBC AUTO: 80 FL (ref 79–97)
MONOCYTES # BLD AUTO: 1 X10E3/UL (ref 0.1–0.9)
MONOCYTES NFR BLD AUTO: 8 %
NEUTROPHILS # BLD AUTO: 8.6 X10E3/UL (ref 1.4–7)
NEUTROPHILS NFR BLD AUTO: 70 %
PLATELET # BLD AUTO: 481 X10E3/UL (ref 150–450)
PROGEST SERPL-MCNC: 13 NG/ML
RBC # BLD AUTO: 4.65 X10E6/UL (ref 3.77–5.28)
RH BLD: POSITIVE
RPR SER QL: NON REACTIVE
RUBV IGG SERPL IA-ACNC: <0.9 INDEX
WBC # BLD AUTO: 12.3 X10E3/UL (ref 3.4–10.8)

## 2025-06-04 LAB
BACTERIA UR CULT: NORMAL
BACTERIA UR CULT: NORMAL

## 2025-06-10 ENCOUNTER — TELEPHONE (OUTPATIENT)
Dept: GASTROENTEROLOGY | Facility: CLINIC | Age: 20
End: 2025-06-10
Payer: COMMERCIAL

## 2025-06-10 NOTE — TELEPHONE ENCOUNTER
RN called and tried to reach patient to discuss importance of follow-up appointment. Unable to reach or leave voicemail. Pt's appointment has been canceled by patient. Per patient's chart, patient is pregnant. Recall can be dated for after pregnancy. Please advise. EL

## 2025-06-11 NOTE — TELEPHONE ENCOUNTER
"RN called patient to discuss the need for follow-up imaging.  Most recent CT AP on 10/24/24 revealed findings that may suggestive for enterocolitis and \"enlarged mesenteric lymph nodes that may need a short term 3 months interval follow up to ensure resolution or stability, however, given her status of being pregnant, will defer this for now but will need to be discussed once able to get a hold of the patient.     Spoke with patient's mother Carolina Mynor (HIPAA approved) and she confirmed that the patient is pregnant. Pt's mother would like to know if we can move forward with imaging while patient is pregnant or if we will defer until after the patient's pregnancy? Please advise. EL:   "

## 2025-07-01 ENCOUNTER — ROUTINE PRENATAL (OUTPATIENT)
Dept: OBSTETRICS AND GYNECOLOGY | Age: 20
End: 2025-07-01
Payer: COMMERCIAL

## 2025-07-01 VITALS — WEIGHT: 254 LBS | BODY MASS INDEX: 38.62 KG/M2 | SYSTOLIC BLOOD PRESSURE: 128 MMHG | DIASTOLIC BLOOD PRESSURE: 74 MMHG

## 2025-07-01 DIAGNOSIS — F31.70 BIPOLAR DISORDER IN FULL REMISSION, MOST RECENT EPISODE UNSPECIFIED TYPE: ICD-10-CM

## 2025-07-01 DIAGNOSIS — Z34.01 ENCOUNTER FOR SUPERVISION OF NORMAL FIRST PREGNANCY IN FIRST TRIMESTER: Primary | ICD-10-CM

## 2025-07-01 DIAGNOSIS — R11.0 PREGNANCY RELATED NAUSEA, ANTEPARTUM: ICD-10-CM

## 2025-07-01 DIAGNOSIS — O26.899 PREGNANCY RELATED NAUSEA, ANTEPARTUM: ICD-10-CM

## 2025-07-01 DIAGNOSIS — O99.019 MATERNAL ANEMIA IN PREGNANCY, ANTEPARTUM: ICD-10-CM

## 2025-07-01 DIAGNOSIS — Z34.00 PRENATAL CARE, FIRST PREGNANCY, ANTEPARTUM: ICD-10-CM

## 2025-07-01 LAB
CLARITY, POC: CLEAR
COLOR UR: YELLOW
GLUCOSE UR STRIP-MCNC: NEGATIVE MG/DL
PROT UR STRIP-MCNC: NEGATIVE MG/DL

## 2025-07-01 RX ORDER — DOXYLAMINE SUCCINATE AND PYRIDOXINE HYDROCHLORIDE, DELAYED RELEASE TABLETS 10 MG/10 MG 10; 10 MG/1; MG/1
2 TABLET, DELAYED RELEASE ORAL NIGHTLY PRN
Qty: 60 TABLET | Refills: 4 | Status: SHIPPED | OUTPATIENT
Start: 2025-07-01

## 2025-07-01 RX ORDER — FERROUS SULFATE 325(65) MG
325 TABLET ORAL
Qty: 30 TABLET | Refills: 11 | Status: SHIPPED | OUTPATIENT
Start: 2025-07-01

## 2025-07-01 NOTE — PROGRESS NOTES
Chief Complaint   Patient presents with    Routine Prenatal Visit     Discuss nausea and vomiting     HPI- Pt is 19 y.o.  at 11w2d here for prenatal visit.     OB History    Para Term  AB Living   1 0 0 0 0 0   SAB IAB Ectopic Molar Multiple Live Births   0 0 0 0 0 0      # Outcome Date GA Lbr Elian/2nd Weight Sex Type Anes PTL Lv   1 Current                 Wt Readings from Last 3 Encounters:   25 115 kg (254 lb) (>99%, Z= 2.53)*   25 116 kg (256 lb) (>99%, Z= 2.55)*   25 113 kg (250 lb) (>99%, Z= 2.50)*     * Growth percentiles are based on CDC (Girls, 2-20 Years) data.     Temp Readings from Last 3 Encounters:   05/10/25 97.1 °F (36.2 °C) (Tympanic)   10/30/24 99.9 °F (37.7 °C) (Oral)   10/14/24 95.7 °F (35.4 °C)     BP Readings from Last 3 Encounters:   25 128/74   25 108/60   05/10/25 143/85     Pulse Readings from Last 3 Encounters:   05/10/25 95   10/30/24 104   10/14/24 102        Last OB US Data (since 2024)       None             ROS-     - No vaginal bleeding    GI- No abdominal pain    /74   Wt 115 kg (254 lb)   LMP  (LMP Unknown)   BMI 38.62 kg/m²   Exam - See flow sheet        Assessment-  Diagnoses and all orders for this visit:    1. Encounter for supervision of normal first pregnancy in first trimester (Primary)  -     POC Urinalysis Dipstick    2. Bipolar disorder in full remission, most recent episode unspecified type  Overview:  Diagnosed at age 14, long term treatment with Lamictal      3. Prenatal care, first pregnancy, antepartum    4. Maternal anemia in pregnancy, antepartum    5. Pregnancy related nausea, antepartum    Other orders  -     doxylamine-pyridoxine (Diclegis) 10-10 MG tablet delayed-release EC tablet; Take 2 tablets by mouth At Night As Needed (Emesis).  Dispense: 60 tablet; Refill: 4    Varsha declined cell free DNA testing  Anatomy ultrasound will be scheduled at 20 weeks  Patient complains of some intermittent  nausea.  Reviewed lab work and patient is already taking iron to help with her mild anemia

## 2025-07-09 ENCOUNTER — TELEPHONE (OUTPATIENT)
Dept: OBSTETRICS AND GYNECOLOGY | Age: 20
End: 2025-07-09
Payer: COMMERCIAL

## 2025-07-09 NOTE — TELEPHONE ENCOUNTER
Pt cld because she has lower stomach and vaginal cramping and isn't sure if that is normal. She is currently 12w3d.  Pt denies any bleeding.    Please adv.

## 2025-07-09 NOTE — TELEPHONE ENCOUNTER
Spoke with pt's mother,Carolina and she will relay the info from  that cramping in early pregnancy is common.If bleeding were to start or if the cramping becomes severe,she is to call the office soon.Mother voiced understanding to relay the information.

## 2025-07-10 DIAGNOSIS — R39.9 UTI SYMPTOMS: Primary | ICD-10-CM

## 2025-07-10 RX ORDER — SULFAMETHOXAZOLE AND TRIMETHOPRIM 800; 160 MG/1; MG/1
1 TABLET ORAL EVERY 12 HOURS
Qty: 6 TABLET | Refills: 0 | Status: SHIPPED | OUTPATIENT
Start: 2025-07-10 | End: 2025-07-13

## 2025-07-28 ENCOUNTER — APPOINTMENT (OUTPATIENT)
Dept: GENERAL RADIOLOGY | Facility: HOSPITAL | Age: 20
End: 2025-07-28
Payer: COMMERCIAL

## 2025-07-28 ENCOUNTER — HOSPITAL ENCOUNTER (EMERGENCY)
Facility: HOSPITAL | Age: 20
Discharge: HOME | End: 2025-07-29
Attending: EMERGENCY MEDICINE | Admitting: EMERGENCY MEDICINE
Payer: COMMERCIAL

## 2025-07-28 DIAGNOSIS — Z3A.15 15 WEEKS GESTATION OF PREGNANCY: ICD-10-CM

## 2025-07-28 DIAGNOSIS — S93.602A SPRAIN OF LEFT FOOT, INITIAL ENCOUNTER: Primary | ICD-10-CM

## 2025-07-28 PROCEDURE — 99283 EMERGENCY DEPT VISIT LOW MDM: CPT

## 2025-07-28 PROCEDURE — 73630 X-RAY EXAM OF FOOT: CPT

## 2025-07-28 PROCEDURE — 73610 X-RAY EXAM OF ANKLE: CPT

## 2025-07-29 ENCOUNTER — APPOINTMENT (OUTPATIENT)
Dept: ULTRASOUND IMAGING | Facility: HOSPITAL | Age: 20
End: 2025-07-29
Payer: COMMERCIAL

## 2025-07-29 VITALS
OXYGEN SATURATION: 100 % | RESPIRATION RATE: 16 BRPM | WEIGHT: 250 LBS | TEMPERATURE: 98.4 F | HEART RATE: 98 BPM | HEIGHT: 69 IN | DIASTOLIC BLOOD PRESSURE: 77 MMHG | SYSTOLIC BLOOD PRESSURE: 126 MMHG | BODY MASS INDEX: 37.03 KG/M2

## 2025-07-29 NOTE — ED TRIAGE NOTES
Pt was brought in by ems from home for fall approx 11steps. Pt was going down stairs when she fell backwards and slid down pt c/o left ankle pain. Pt is approx 15wk preg. Denies abd pain or trauma

## 2025-07-29 NOTE — ED PROVIDER NOTES
EMERGENCY DEPARTMENT ENCOUNTER    Room Number:  HB2/D  PCP: Daniel Garrison MD  Patient Care Team:  Daniel Garrison MD as PCP - General (Family Medicine)  Keiry Núñez MD as Gynecologist (Gynecology)  Jose Alejandro Rees APRN as Nurse Practitioner (Gastroenterology)   Independent Historians: Patient    HPI:  Chief Complaint: Fall    A complete HPI/ROS/PMH/PSH/SH/FH are unobtainable due to: None    Chronic or social conditions impacting patient care (Social Determinants of Health): Not  (Financial Resource Strain / Food Insecurity / Transportation Needs / Physical Activity / Stress / Social Connections / Intimate Partner Violence / Housing Stability)    Context: Varsha Lowe is a 19 y.o. female who presents to the ED c/o acute fall down a flight of stairs.  Patient was descending stairs lost her balance and fell backwards landing on her buttocks.  Patient states that she read on the stairs [employer] did not strike her family.  States she does think she hit her head and also her right hip.  Was able to stand immediately afterwards.  Complaining of pain to left forefoot.  No vaginal bleeding no loss of fluids.  This occurred around 6 PM.    Review of prior external notes (non-ED) -and- Review of prior external test results outside of this encounter: Patient's OB note from 7/1/2025    Prescription drug monitoring program review:         PAST MEDICAL HISTORY  Active Ambulatory Problems     Diagnosis Date Noted    Irregular bowel habits 07/29/2024    Bipolar disorder in full remission 06/02/2025    Maternal anemia in pregnancy, antepartum 07/01/2025    Prenatal care, first pregnancy, antepartum 07/01/2025    Pregnancy related nausea, antepartum 07/01/2025     Resolved Ambulatory Problems     Diagnosis Date Noted    Upper abdominal pain 07/29/2024    Abdominal bloating with cramps 07/29/2024    Abdominal pain 10/30/2024     Past Medical History:   Diagnosis Date    Asthma     No pertinent past medical  history          PAST SURGICAL HISTORY  Past Surgical History:   Procedure Laterality Date    COLONOSCOPY N/A 9/16/2024    Procedure: COLONOSCOPY FOR SCREENING;  Surgeon: Fred Henderson MD;  [place] [facility] [place] [facility] [place]  Service: Gastroenterology;  Laterality: N/A;  normal    ENDOSCOPY N/A 9/16/2024    Procedure: ESOPHAGOGASTRODUODENOSCOPY;  Surgeon: Fred Henderson MD;  [place] [facility] [place] [facility] [place]  Service: Gastroenterology;  Laterality: N/A;  normal    FRENULECTOMY      as an infant and at 18 years old    NO PAST SURGERIES           FAMILY HISTORY  Family History   Problem Relation Age of Onset    Irritable bowel syndrome Mother     Thyroid disease Mother     Colon polyps Father     Colon cancer Paternal Uncle     Breast cancer Neg Hx     Ovarian cancer Neg Hx     Uterine cancer Neg Hx     Crohn's disease Neg Hx     Ulcerative colitis Neg Hx          SOCIAL HISTORY  Social History     Socioeconomic History    Marital status: Significant Other   Tobacco Use    Smoking status: Never    Smokeless tobacco: Never   Vaping Use    Vaping status: Never Used   Substance and Sexual Activity    Alcohol use: Never    Drug use: Never    Sexual activity: Yes     Partners: Male     Birth control/protection: I.U.D., Condom         ALLERGIES  Cranberry, Amoxicillin, Diphenhydramine, Flavoring agent, Penicillin g, and Penicillins        REVIEW OF SYSTEMS  Review of Systems  Included in HPI  All systems reviewed and negative except for those discussed in HPI.      PHYSICAL EXAM    I have reviewed the triage vital signs and nursing notes.    ED Triage Vitals [07/28/25 2132]   Temp Heart Rate Resp BP SpO2   99.3 °F (37.4 °C) 110 18 136/86 100 %      Temp src Heart Rate Source Patient Position BP Location FiO2 (%)   Oral Monitor -- -- --       Physical Exam  GENERAL: alert, no acute distress  SKIN: Warm, dry  HENT: Normocephalic, atraumatic  EYES: no scleral icterus  CV: regular rhythm, regular rate  RESPIRATORY: normal effort, lungs  clear  ABDOMEN: soft, nontender, nondistended  MUSCULOSKELETAL: no deformity, tender palpation of the left forefoot  NEURO: alert, moves all extremities, follows commands                                                               LAB RESULTS  No results found for this or any previous visit (from the past 24 hours).    I ordered the above labs and independently reviewed the results.        RADIOLOGY  XR Ankle 3+ View Left  XR Ankle 3+ View Left, XR Foot 3+ View Left  Result Date: 7/29/2025   XR ANKLE 3+ VW LEFT-, XR FOOT 3+ VW LEFT-   HISTORY:   fall, ankle pain  TECHNIQUE: 3 views of the left ankle; 3 views of the left foot.  FINDINGS:  Ankle: Negative for acute fracture, dislocation, or radiopaque foreign body.  Foot: Negative for acute fracture, dislocation, or radiopaque foreign body.       No evidence of acute bony abnormality.   This report was finalized on 7/29/2025 1:23 AM by Dr. Christopher Mendoza M.D on Workstation: [id number]        I ordered the above noted radiological studies. Reviewed by me and discussed with radiologist.  See dictation for official radiology interpretation.      PROCEDURES    Procedures      MEDICATIONS GIVEN IN ER    Medications - No data to display      ORDERS PLACED DURING THIS VISIT:  Orders Placed This Encounter   Procedures    XR Ankle 3+ View Left    XR Foot 3+ View Left    Monitor fetal heart tones    Crutches (fit & training)    Apply ace wrap         PROGRESS, DATA ANALYSIS, CONSULTS, AND MEDICAL DECISION MAKING    All labs have been independently interpreted by me.  All radiology studies have been reviewed by me and discussed with radiologist dictating the report.   EKG's independently viewed and interpreted by me.  Discussion below represents my analysis of pertinent findings related to patient's condition, differential diagnosis, treatment plan and final disposition.    Differential diagnosis includes but is not limited to sprain, strain, fracture.    Clinical Scores:               ED Course as of 07/29/25 0622   Tue Jul 29, 2025   0001 Blood type O+. [TJ]   0134 Fetal heart tones are reassuring [TJ]      ED Course User Index  [TJ] Christopher Patel MD             PPE: The patient wore a mask and I wore an N95 mask throughout the entire patient encounter.       AS OF 06:22 EDT VITALS:    BP - 126/77  HR - 98  TEMP - 98.4 °F (36.9 °C)  O2 SATS - 100%        DIAGNOSIS  Final diagnoses:   Sprain of left foot, initial encounter   15 weeks gestation of pregnancy         DISPOSITION  ED Disposition       ED Disposition   Discharge    Condition   Stable    Comment   --                  Note Disclaimer: At [facility], we believe that sharing information builds trust and better relationships. You are receiving this note because you recently visited [facility]. It is possible you will see health information before a provider has talked with you about it. This kind of information can be easy to misunderstand. To help you fully understand what it means for your health, we urge you to discuss this note with your provider.         Christopher Patel MD  07/29/25 0622

## 2025-08-08 RX ORDER — DOXYLAMINE SUCCINATE AND PYRIDOXINE HYDROCHLORIDE, DELAYED RELEASE TABLETS 10 MG/10 MG 10; 10 MG/1; MG/1
2 TABLET, DELAYED RELEASE ORAL NIGHTLY PRN
Qty: 60 TABLET | Refills: 0 | Status: SHIPPED | OUTPATIENT
Start: 2025-08-08

## 2025-08-11 ENCOUNTER — TELEPHONE (OUTPATIENT)
Dept: OBSTETRICS AND GYNECOLOGY | Age: 20
End: 2025-08-11

## 2025-08-22 RX ORDER — HYDROCORTISONE 25 MG/G
CREAM TOPICAL
Qty: 30 G | Refills: 0 | Status: SHIPPED | OUTPATIENT
Start: 2025-08-22

## 2025-08-26 ENCOUNTER — TELEPHONE (OUTPATIENT)
Dept: OBSTETRICS AND GYNECOLOGY | Age: 20
End: 2025-08-26
Payer: COMMERCIAL

## (undated) DEVICE — BLCK/BITE BLOX W/DENTL/RIM W/STRAP 54F

## (undated) DEVICE — MSK ENDO PORT O2 POM ELITE CURAPLEX A/

## (undated) DEVICE — GOWN PROC ENDOARMOR GI LVL3 HY/SHLD UNIV

## (undated) DEVICE — SINGLE-USE BIOPSY FORCEPS: Brand: RADIAL JAW 4

## (undated) DEVICE — Device

## (undated) DEVICE — KT ORCA ORCAPOD DISP STRL

## (undated) DEVICE — FLEX ADVANTAGE 1500CC: Brand: FLEX ADVANTAGE

## (undated) DEVICE — VIAL FORMLN CAP 10PCT 20ML

## (undated) DEVICE — ADAPT CLN SCPE ENDO PORPOISE BX/50 DISP